# Patient Record
Sex: MALE | HISPANIC OR LATINO | Employment: FULL TIME | URBAN - NONMETROPOLITAN AREA
[De-identification: names, ages, dates, MRNs, and addresses within clinical notes are randomized per-mention and may not be internally consistent; named-entity substitution may affect disease eponyms.]

---

## 2017-08-23 ENCOUNTER — OCCUPATIONAL MEDICINE (OUTPATIENT)
Dept: URGENT CARE | Facility: PHYSICIAN GROUP | Age: 67
End: 2017-08-23
Payer: COMMERCIAL

## 2017-08-23 VITALS
SYSTOLIC BLOOD PRESSURE: 150 MMHG | BODY MASS INDEX: 30.16 KG/M2 | HEART RATE: 60 BPM | RESPIRATION RATE: 16 BRPM | DIASTOLIC BLOOD PRESSURE: 72 MMHG | WEIGHT: 181 LBS | TEMPERATURE: 98.2 F | OXYGEN SATURATION: 95 % | HEIGHT: 65 IN

## 2017-08-23 DIAGNOSIS — S80.01XA CONTUSION OF RIGHT KNEE, INITIAL ENCOUNTER: ICD-10-CM

## 2017-08-23 DIAGNOSIS — S86.911A KNEE STRAIN, RIGHT, INITIAL ENCOUNTER: ICD-10-CM

## 2017-08-23 PROCEDURE — 99214 OFFICE O/P EST MOD 30 MIN: CPT | Mod: 29 | Performed by: PHYSICIAN ASSISTANT

## 2017-08-23 ASSESSMENT — PAIN SCALES - GENERAL: PAINLEVEL: 10=SEVERE PAIN

## 2017-08-23 NOTE — Clinical Note
"EMPLOYEE’S CLAIM FOR COMPENSATION/ REPORT OF INITIAL TREATMENT  FORM C-4    EMPLOYEE’S CLAIM - PROVIDE ALL INFORMATION REQUESTED   First Name  Leobardo Last Name  Florin Birthdate                    1950                Sex  male Claim Number   Home Address  [address] Age  67 y.o. Height  1.651 m (5' 5\") Weight  82.101 kg (181 lb) N     [facility] Zip  [address] Telephone  [phone] (home)    Mailing Address  [address] [facility] Zip  [address] Primary Language Spoken  Divehi    Insurer  [place] [employer]   [place]   Employee's Occupation (Job Title) When Injury or Occupational Disease Occurred  [employer]    Employer's Name  OTHER  Telephone      Employer Address    City    State    Zip      Date of Injury  8/23/2017               Hour of Injury  8:30 AM Date Employer Notified  8/23/2017 Last Day of Work after Injury or Occupational Disease  8/23/2017 Supervisor to Whom Injury Reported  Lobito Aburto   Address or Location of Accident (if applicable)  [N/A]   What were you doing at the time of accident? (if applicable)  Moving things around    How did this injury or occupational disease occur? (Be specific an answer in detail. Use additional sheet if necessary)  Tripped over a tube   If you believe that you have an occupational disease, when did you first have knowledge of the disability and it relationship to your employment?  n/a Witnesses to the Accident  n/a      Nature of Injury or Occupational Disease  Workers' Compensation  Part(s) of Body Injured or Affected  Knee (R), ,     I certify that the above is true and correct to the best of my knowledge and that I have provided this information in order to obtain the benefits of [place] or [place]  I hereby authorize any physician, " chiropractor, surgeon, practitioner, or other person, any hospital, including [facility] or [facility], any medical service organization, any insurance company, or other institution or organization to release to each other, any medical or other information, including benefits paid or payable, pertinent to this injury or disease, except information relative to diagnosis, treatment and/or counseling for AIDS, psychological conditions, alcohol or controlled substances, for which I must give specific authorization.  A Photostat of this authorization shall be as valid as the original.     Date 08/23/2017   [facility]   Employee’s Signature   THIS REPORT MUST BE COMPLETED AND MAILED WITHIN 3 WORKING DAYS OF TREATMENT   [facility]  Name of Facility  [place]   Date  8/23/2017 Diagnosis  (S86.911A) Knee strain, right, initial encounter  (S80.01XA) Contusion of right knee, initial encounter Is there evidence the injured employee was under the influence of alcohol and/or another controlled substance at the time of accident?   Hour  10:59 AM Description of Injury or Disease  Diagnoses of Knee strain, right, initial encounter and Contusion of right knee, initial encounter were pertinent to this visit. No   Treatment  Rest, ice, over-the-counter anti-inflammatory  Have you advised the patient to remain off work five days or more? No   X-Ray Findings      If Yes   From Date  To Date      From information given by the employee, together with medical evidence, can you directly connect this injury or occupational disease as job incurred?  Yes If No Full Duty  No Modified Duty  Yes   Is additional medical care by a physician indicated?  Yes  Comments:follow-up in one week If Modified Duty, Specify any Limitations / Restrictions  No bending, climbing, squatting, no lifting more than 10 pounds.   Do you know of any previous injury or disease contributing to this condition  "or occupational disease?                            Yes  Comments:history of bilateral knee pain documented in his chart from primary care   Date  8/23/2017 Print Doctor’s Name Dwayne Holland PA-C I certify the employer’s copy of  this form was mailed on:   Address  [address] Insurer’s Use Only     [facility] Zip  [address]    Provider’s Tax ID Number  [id number]  Telephone  Dept: [phone]        e-SignSTADWAYNE WASHINGTON PA-C   e-Signature: Dr. Ambrosio Clark, Medical Director Degree           ORIGINAL-TREATING PHYSICIAN OR CHIROPRACTOR    PAGE 2-INSURER/TPA    PAGE 3-EMPLOYER    PAGE 4-EMPLOYEE             Form C-4 (rev10/07)              BRIEF DESCRIPTION OF RIGHTS AND BENEFITS  (Pursuant to [place]RS 616C.050)    Notice of Injury or Occupational Disease (Incident Report Form C-1): If an injury or occupational disease (OD) arises out of and in the  course of employment, you must provide written notice to your employer as soon as practicable, but no later than 7 days after the accident or  OD. Your employer shall maintain a sufficient supply of the required forms.    Claim for Compensation (Form C-4): If medical treatment is sought, the form C-4 is available at the place of initial treatment. A completed  \"Claim for Compensation\" (Form C-4) must be filed within 90 days after an accident or OD. The treating physician or chiropractor must,  within 3 working days after treatment, complete and mail to the employer, the employer's insurer and third-party , the Claim for  Compensation.    Medical Treatment: If you require medical treatment for your on-the-job injury or OD, you may be required to select a physician or  chiropractor from a list provided by your workers’ compensation insurer, if it has contracted with an Organization for Managed Care (MCO) or  Preferred Provider Organization (PPO) or providers of health care. If your employer has not entered into a contract with an MCO " or PPO, you  may select a physician or chiropractor from the Panel of Physicians and Chiropractors. Any medical costs related to your industrial injury or  OD will be paid by your insurer.    Temporary Total Disability (TTD): If your doctor has certified that you are unable to work for a period of at least 5 consecutive days, or 5  cumulative days in a 20-day period, or places restrictions on you that your employer does not accommodate, you may be entitled to TTD  compensation.    Temporary Partial Disability (TPD): If the wage you receive upon reemployment is less than the compensation for TTD to which you are  entitled, the insurer may be required to pay you TPD compensation to make up the difference. TPD can only be paid for a maximum of 24  months.    Permanent Partial Disability (PPD): When your medical condition is stable and there is an indication of a PPD as a result of your injury or  OD, within 30 days, your insurer must arrange for an evaluation by a rating physician or chiropractor to determine the degree of your PPD. The  amount of your PPD award depends on the date of injury, the results of the PPD evaluation and your age and wage.    Permanent Total Disability (PTD): If you are medically certified by a treating physician or chiropractor as permanently and totally disabled  and have been granted a PTD status by your insurer, you are entitled to receive monthly benefits not to exceed 66 2/3% of your average  monthly wage. The amount of your PTD payments is subject to reduction if you previously received a PPD award.    Vocational Rehabilitation Services: You may be eligible for vocational rehabilitation services if you are unable to return to the job due to a  permanent physical impairment or permanent restrictions as a result of your injury or occupational disease.    Transportation and Per Eloisa Reimbursement: You may be eligible for travel expenses and per eloisa associated with medical  treatment.    Reopening: You may be able to reopen your claim if your condition worsens after claim closure.    Appeal Process: If you disagree with a written determination issued by the insurer or the insurer does not respond to your request, you may  appeal to the Department of Administration, , by following the instructions contained in your determination letter. You must  appeal the determination within 70 days from the date of the determination letter at [address], [address], [place], [place]  [address], or [address], [place], [place] [address]. If you disagree with the  decision, you may appeal to the  Department of Administration, . You must file your appeal within 30 days from the date of the  decision  letter at [address], [address], [place], [place] [address], or [address], [place], [place] [address]. If you  disagree with a decision of an , you may file a petition for judicial review with the District Court. You must do so within 30  days of the Appeal Officer’s decision. You may be represented by an  at your own expense or you may contact the [facility] for possible  representation.    [place]  for Injured Workers (NAIW): If you disagree with a  decision, you may request that NAIW represent you  without charge at an  Hearing. For information regarding denial of benefits, you may contact the [facility] at: [address], [address], [place] [address], [phone], or [address], [place] [address], [phone]    To File a Complaint with the Division: If you wish to file a complaint with the  of the Division of Industrial Relations (DIR),  please contact the Workers’ Compensation Section, [address], [address], [place], [place] [address], telephone (114) [phone], or  [address], [address], [place], [place] [address], telephone [phone]) [phone].    For assistance with Workers’ Compensation Issues: you may contact the Office of the Governor Consumer Health Assistance, [address] [place] [address], [address], [place], [place] [address], Toll Free 1-789.310.8000, Web site: http://[employer].[facility], [facility].nv., E-mail  [email or web address], [facility].[email or web address].                                                                                                                                                                                                                                   __________________________________________________________________                                                                   ___08/23/2017______________                Employee Name / Signature                                                                                                                                                       Date                                                                                                                                                                                                     D-2 (rev. 10/07)

## 2017-08-23 NOTE — Clinical Note
[address] [place] [address]  Phone: [phone] - Fax: [phone]        [facility] Progress Report and Disability Certification  Date of Service: 2017   No Show:  No  Date / Time of Next Visit: 2017   Claim Information   Patient Name: Leobardo Catherine  Claim Number:     Employer: [employer] Date of Injury: 2017     Insurer / TPA: Anisha  ID / SSN:     Occupation: [employer]  Diagnosis: Diagnoses of Knee strain, right, initial encounter and Contusion of right knee, initial encounter were pertinent to this visit.    Medical Information   Related to Industrial Injury? Yes    Subjective Complaints:  Right knee pain after fall   Objective Findings: Right knee has minimal superficial abrasion over the patella. He has tenderness to palpation of the musculature and tissues superior to the patella. No specific joint line or popliteal space tenderness. Good distal range of motion, strength, circulation and sensation.     Pre-Existing Condition(s):     Assessment:   Initial Visit    Status: Additional Care Required  Comments:follow-up in one week  Permanent Disability:No    Plan: Medication  Comments:otc nsaid    Diagnostics:      Comments:       Disability Information   Status: Released to Restricted Duty    From:  2017  Through: 2017 Restrictions are: Temporary   Physical Restrictions   Sitting:    Standing:    Comments:no more than 30 minutes at a time without being able to rest the right knee Stoopin hrs/day Bendin hrs/day   Squattin hrs/day Walking:    Comments:no more than 30 minutes at a time without being able to rest the right knee Climbing:    Pushing:    Comments:no more than 10 pounds   Pulling:    Comments:10 pounds Other:    Reaching Above Shoulder (L):   Reaching Above Shoulder (R):       Reaching Below Shoulder (L):    Reaching Below Shoulder (R):      Not to exceed Weight  Limits   Carrying(hrs):   Weight Limit(lb): < or = to 10 pounds Lifting(hrs):   Weight  Limit(lb): < or = to 10 pounds   Comments:      Repetitive Actions   Hands: i.e. Fine Manipulations from Grasping:     Feet: i.e. Operating Foot Controls:     Driving / Operate Machinery:     Physician Name: Dwayne Holland PA-C Physician Signature: DWAYNE Avilez PA-C e-Signature: Dr. Ambrosio Clark, Medical Director   Clinic Name / Location: [address] [address] Clinic Phone Number: Dept: [phone]   Appointment Time: 10:45 Am Visit Start Time: 10:59 AM   Check-In Time:  10:53 Am Visit Discharge Time:  11:19 am   Original-Treating Physician or Chiropractor    Page 2-Insurer/TPA    Page 3-Employer    Page 4-Employee

## 2017-08-23 NOTE — MR AVS SNAPSHOT
"        Leobardo Catherine   2017 10:45 AM   Occupational Medicine   MRN: [id number]    Department:  [facility]   Dept Phone:  [phone]    Description:  Male : 1950   Provider:  Adams Holland PA-C           Reason for Visit     Knee Injury R knee pain after fall      Allergies as of 2017     No Known Allergies      You were diagnosed with     Knee strain, right, initial encounter   [[id number]]       Contusion of right knee, initial encounter   [[id number]]         Vital Signs     Blood Pressure Pulse Temperature Respirations Height Weight    150/72 mmHg 60 36.8 °C (98.2 °F) 16 1.651 m (5' 5\") 82.101 kg (181 lb)    Body Mass Index Oxygen Saturation Smoking Status             30.12 kg/m2 95% Former Smoker         Basic Information     Date Of Birth Sex Race Ethnicity Preferred Language    1950 Male  or   Origin (Armenian,Ugandan,Rwandan,Zambian, etc) Armenian      Health Maintenance        Date Due Completion Dates    IMM DTaP/Tdap/Td Vaccine (1 - Tdap) 1969 ---    COLONOSCOPY 2000 ---    IMM ZOSTER VACCINE 2010 ---    IMM PNEUMOCOCCAL 65+ (ADULT) LOW/MEDIUM RISK SERIES (1 of 2 - PCV13) 2015 ---    IMM INFLUENZA (1) 2017 ---            Current Immunizations     No immunizations on file.      Below and/or attached are the medications your provider expects you to take. Review all of your home medications and newly ordered medications with your provider and/or pharmacist. Follow medication instructions as directed by your provider and/or pharmacist. Please keep your medication list with you and share with your provider. Update the information when medications are discontinued, doses are changed, or new medications (including over-the-counter products) are added; and carry medication information at all times in the event of emergency situations     Allergies:  No Known Allergies          Medications  Valid as of: 2017 - 11:22 AM    Generic Name " Brand Name Tablet Size Instructions for use    Bismuth Subsalicylate (Suspension) PEPTO-BISMOL 262 MG/15ML Take 30 mL by mouth every 6 hours as needed.        Ibuprofen   Take  by mouth.        Meloxicam (Tab) MOBIC 15 MG Take 1 Tab by mouth every day.        MethylPREDNISolone (Tab) MEDROL DOSPACK 4 MG Take as directed.        .                 Medicines prescribed today were sent to:     [facility] [address] [place] [address], [place] - [address] [facility] [facility] [address] [place]    [address] [place] [address]    Phone: [phone] Fax: [phone]    Open 24 Hours?: No      Medication refill instructions:       If your prescription bottle indicates you have medication refills left, it is not necessary to call your provider’s office. Please contact your pharmacy and they will refill your medication.    If your prescription bottle indicates you do not have any refills left, you may request refills at any time through one of the following ways: The online Bee There system (except Urgent Care), by calling your provider’s office, or by asking your pharmacy to contact your provider’s office with a refill request. Medication refills are processed only during regular business hours and may not be available until the next business day. Your provider may request additional information or to have a follow-up visit with you prior to refilling your medication.   *Please Note: Medication refills are assigned a new Rx number when refilled electronically. Your pharmacy may indicate that no refills were authorized even though a new prescription for the same medication is available at the pharmacy. Please request the medicine by name with the pharmacy before contacting your provider for a refill.           Bee There Access Code: [email or web address]  Expires: 9/22/2017 11:22 AM    Your email address is not on file at [facility].  Email Addresses are required for you to sign up for Bee There, please contact  [phone] to verify your personal information and to provide your email address prior to attempting to register for Marcandit.    Leobardo Catherine  [address] [place] [address]    Merge Social  A secure, online tool to manage your health information     [employer]’s Merge Social® is a secure, online tool that connects you to your personalized health information from the privacy of your home -- day or night - making it very easy for you to manage your healthcare. Once the activation process is completed, you can even access your medical information using the Merge Social hayley, which is available for free in the Apple Hayley store or Google Play store.     To learn more about Merge Social, visit [email or web address]    There are two levels of access available (as shown below):   My Chart Features  [facility] Primary [facility] Doctor [facility]  Specialists [facility]  Urgent  Care Non-[facility] Primary Care Doctor   Email your healthcare team securely and privately 24/7 X X X    Manage appointments: schedule your next appointment; view details of past/upcoming appointments X      Request prescription refills. X      View recent personal medical records, including lab and immunizations X X X X   View health record, including health history, allergies, medications X X X X   Read reports about your outpatient visits, procedures, consult and ER notes X X X X   See your discharge summary, which is a recap of your hospital and/or ER visit that includes your diagnosis, lab results, and care plan X X  X     How to register for Marcandit:  Once your e-mail address has been verified, follow the following steps to sign up for Marcandit.     1. Go to  [email or web address]  2. Click on the Sign Up Now box, which takes you to the New Member Sign Up page. You will need to provide the following information:  a. Enter your Merge Social Access Code exactly as it appears at the top of this page. (You will not need to use this code after you’ve completed the sign-up  process. If you do not sign up before the expiration date, you must request a new code.)   b. Enter your date of birth.   c. Enter your home email address.   d. Click Submit, and follow the next screen’s instructions.  3. Create a [employer] ID. This will be your [employer] login ID and cannot be changed, so think of one that is secure and easy to remember.  4. Create a [employer] password. You can change your password at any time.  5. Enter your Password Reset Question and Answer. This can be used at a later time if you forget your password.   6. Enter your e-mail address. This allows you to receive e-mail notifications when new information is available in [employer].  7. Click Sign Up. You can now view your health information.    For assistance activating your [employer] account, call [phone]

## 2017-08-23 NOTE — PROGRESS NOTES
Chief Complaint   Patient presents with   • Knee Injury     R knee pain after fall       HISTORY OF PRESENT ILLNESS: Patient is a 67 y.o. male who presents today becauseHe has right knee pain. This is after fall at work approximately 3 hours ago. He slipped and tripped over some tubes that he was moving, he went down on his right knee. His pain is just above his kneecap in the muscle and tissue area. Denies any distal paresthesias. He has not been putting any ice or heat on the area, he did take some ibuprofen.. He does have some history of bilateral knee pain as documented in his chart approximately 2 years ago.    There are no active problems to display for this patient.      Allergies:Review of patient's allergies indicates no known allergies.    Current Outpatient Prescriptions Ordered in [facility]   Medication Sig Dispense Refill   • Ibuprofen (MOTRIN PO) Take  by mouth.     • methylPREDNISolone (MEDROL DOSPACK) 4 MG TABS Take as directed. 1 Tab 0   • meloxicam (MOBIC) 15 MG tablet Take 1 Tab by mouth every day. 30 Tab 0   • bismuth subsalicylate (PEPTO-BISMOL) 262 MG/15ML SUSP Take 30 mL by mouth every 6 hours as needed.       No current Epic-ordered facility-administered medications on file.       No past medical history on file.    Social History   Substance Use Topics   • Smoking status: Former Smoker   • Smokeless tobacco: Never Used   • Alcohol Use: No       No family status information on file.   No family history on file.    ROS:  Review of Systems   Constitutional: Negative for fever, chills, weight loss and malaise/fatigue.   HENT: Negative for ear pain, nosebleeds, congestion, sore throat and neck pain.    Eyes: Negative for blurred vision.   Respiratory: Negative for cough, sputum production, shortness of breath and wheezing.    Cardiovascular: Negative for chest pain, palpitations, orthopnea and leg swelling.       Exam:  Blood pressure 150/72, pulse 60, temperature 36.8 °C (98.2 °F), resp. rate 16,  "height 1.651 m (5' 5\"), weight 82.101 kg (181 lb), SpO2 95 %.  General:  Well nourished, well developed male in NAD  Head:Normocephalic, atraumatic  Eyes: PERRLA, EOM within normal limits, no conjunctival injection, no scleral icterus, visual fields and acuity grossly intact.  Extremities: no clubbing, cyanosis, or edema.   Right knee has minimal superficial abrasion over the patella. He has tenderness to palpation of the musculature and tissues superior to the patella. No specific joint line or popliteal space tenderness. Good distal range of motion, strength, circulation and sensation.    Please note that this dictation was created using voice recognition software. I have made every reasonable attempt to correct obvious errors, but I expect that there are errors of grammar and possibly content that I did not discover before finalizing the note.    Assessment/Plan:  1. Knee strain, right, initial encounter     2. Contusion of right knee, initial encounter     Encouraged gentle range of motion, weightbearing as tolerated, restrictions, ice, over-the-counter ibuprofen, follow-up in one week        "

## 2017-08-30 ENCOUNTER — OCCUPATIONAL MEDICINE (OUTPATIENT)
Dept: URGENT CARE | Facility: PHYSICIAN GROUP | Age: 67
End: 2017-08-30
Payer: COMMERCIAL

## 2017-08-30 VITALS
RESPIRATION RATE: 16 BRPM | HEART RATE: 74 BPM | OXYGEN SATURATION: 98 % | HEIGHT: 65 IN | WEIGHT: 184 LBS | DIASTOLIC BLOOD PRESSURE: 92 MMHG | BODY MASS INDEX: 30.66 KG/M2 | SYSTOLIC BLOOD PRESSURE: 144 MMHG | TEMPERATURE: 99.4 F

## 2017-08-30 DIAGNOSIS — S86.911A KNEE STRAIN, RIGHT, INITIAL ENCOUNTER: ICD-10-CM

## 2017-08-30 PROCEDURE — 99214 OFFICE O/P EST MOD 30 MIN: CPT | Performed by: PHYSICIAN ASSISTANT

## 2017-08-30 RX ORDER — TRAMADOL HYDROCHLORIDE 50 MG/1
50 TABLET ORAL EVERY 4 HOURS PRN
Qty: 30 TAB | Refills: 0 | Status: SHIPPED | OUTPATIENT
Start: 2017-08-30 | End: 2017-12-01

## 2017-08-30 RX ORDER — NAPROXEN 500 MG/1
500 TABLET ORAL 2 TIMES DAILY WITH MEALS
Qty: 60 TAB | Refills: 0 | Status: SHIPPED | OUTPATIENT
Start: 2017-08-30 | End: 2017-09-11

## 2017-08-30 NOTE — LETTER
[address] [place] [address]  Phone: [phone] - Fax: [phone]        [facility] Progress Report and Disability Certification  Date of Service: 2017   No Show:  No  Date / Time of Next Visit: 2017   Claim Information   Patient Name: Leobardo Catherine  Claim Number:     Employer: OTHER  Date of Injury: 2017     Insurer / TPA: Anisha  ID / SSN:     Occupation: Spout  Diagnosis: The encounter diagnosis was Knee strain, right, initial encounter.    Medical Information   Related to Industrial Injury? Yes    Subjective Complaints:  Worsening right knee pain after fall at work 6 days ago   Objective Findings: Right knee has some visible swelling just superior to the patella, he has tenderness in the area of the same, no popliteal space or joint line tenderness. Distally he has good range of motion, albeit somewhat limited by pain, he has good distal circulation and sensation.     Pre-Existing Condition(s):     Assessment:   Condition Worsened    Status: Additional Care Required  Comments:physical therapy, follow-up in 2 weeks  Permanent Disability:No    Plan: MedicationMedication (NOT at Work)  Comments:Ultram not to be taken at work or while driving    Diagnostics:      Comments:       Disability Information   Status: Released to Restricted Duty    From:  2017  Through: 2017 Restrictions are: Temporary   Physical Restrictions   Sitting:    Standing:    Comments:more than 30 minutes at a time without being able to rest and elevate the right leg Stoopin hrs/day Bendin hrs/day   Squattin hrs/day Walking:    Comments:number than 30 minutes at a time without being able to rest and elevate the right leg Climbin hrs/day Pushing:    Comments:no more than 10 pounds   Pulling:    Comments:no more than 10 pounds Other:    Reaching Above Shoulder (L):   Reaching Above Shoulder (R):       Reaching Below  Shoulder (L):    Reaching Below Shoulder (R):      Not to exceed Weight Limits   Carrying(hrs):   Weight Limit(lb): < or = to 10 pounds Lifting(hrs):   Weight  Limit(lb): < or = to 10 pounds   Comments:      Repetitive Actions   Hands: i.e. Fine Manipulations from Grasping:     Feet: i.e. Operating Foot Controls:     Driving / Operate Machinery:     Physician Name: Dwayne Holland P.A.-C. Physician Signature: DWAYNE Avilez P.A.-C. e-Signature: Dr. Ambrosio Clark, Medical Director   Clinic Name / Location: [address] [address] Clinic Phone Number: Dept: [phone]   Appointment Time: 5:00 Pm Visit Start Time: 4:55 PM   Check-In Time:  4:49 Pm Visit Discharge Time:  5:30 PM   Original-Treating Physician or Chiropractor    Page 2-Insurer/TPA    Page 3-Employer    Page 4-Employee

## 2017-08-31 NOTE — PROGRESS NOTES
Chief Complaint   Patient presents with   • Follow-Up     Rt Knee, throbbing pain, Rash-Bilateral-Arms       HISTORY OF PRESENT ILLNESS: Patient is a 67 y.o. male who presents today becauseHe is following up on a work comp injury.    On 8/23/2017. He slipped and tripped over some tubes that he was moving. He went down on his right knee. He started to have pain just above his knee In the muscle and tissue of his right knee and upper leg. He came to the urgent care, advised him to use ibuprofen, ice the area, he has also been using a knee brace. However, his pain is getting worse. She still denies any distal paresthesias but his pain is worsening and he is having difficulty walking, difficulty sleeping because of the pain despite using over-the-counter anti-inflammatory medication    There are no active problems to display for this patient.      Allergies:Review of patient's allergies indicates no known allergies.    Current Outpatient Prescriptions Ordered in [facility]   Medication Sig Dispense Refill   • tramadol (ULTRAM) 50 MG Tab Take 1 Tab by mouth every four hours as needed. 30 Tab 0   • naproxen (NAPROSYN) 500 MG Tab Take 1 Tab by mouth 2 times a day, with meals. 60 Tab 0   • Ibuprofen (MOTRIN PO) Take  by mouth.     • methylPREDNISolone (MEDROL DOSPACK) 4 MG TABS Take as directed. 1 Tab 0   • meloxicam (MOBIC) 15 MG tablet Take 1 Tab by mouth every day. 30 Tab 0   • bismuth subsalicylate (PEPTO-BISMOL) 262 MG/15ML SUSP Take 30 mL by mouth every 6 hours as needed.       No current Epic-ordered facility-administered medications on file.        No past medical history on file.    Social History   Substance Use Topics   • Smoking status: Former Smoker     Types: Cigarettes     Quit date: 1/1/2015   • Smokeless tobacco: Never Used   • Alcohol use No       No family status information on file.   No family history on file.    ROS:  Review of Systems   Constitutional: Negative for fever, chills, weight loss and  "malaise/fatigue.   HENT: Negative for ear pain, nosebleeds, congestion, sore throat and neck pain.    Eyes: Negative for blurred vision.   Respiratory: Negative for cough, sputum production, shortness of breath and wheezing.    Cardiovascular: Negative for chest pain, palpitations, orthopnea and leg swelling.   Gastrointestinal: Negative for heartburn, nausea, vomiting and abdominal pain.   Genitourinary: Negative for dysuria, urgency and frequency.     Exam:  Blood pressure 144/92, pulse 74, temperature 37.4 °C (99.4 °F), resp. rate 16, height 1.651 m (5' 5\"), weight 83.5 kg (184 lb), SpO2 98 %.  General:  Well nourished, well developed male in NAD  Head:Normocephalic, atraumatic  Eyes: PERRLA, EOM within normal limits, no conjunctival injection, no scleral icterus, visual fields and acuity grossly intact.  Extremities: no clubbing, cyanosis, or edema.  Right knee has some visible swelling just superior to the patella, he has tenderness in the area of the same, no popliteal space or joint line tenderness. Distally he has good range of motion, albeit somewhat limited by pain, he has good distal circulation and sensation.    Please note that this dictation was created using voice recognition software. I have made every reasonable attempt to correct obvious errors, but I expect that there are errors of grammar and possibly content that I did not discover before finalizing the note.    Assessment/Plan:  1. Knee strain, right, initial encounter  tramadol (ULTRAM) 50 MG Tab    naproxen (NAPROSYN) 500 MG Tab    REFERRAL TO PHYSICAL THERAPY Reason for Therapy: Eval/Treat/Report   . Continue ice, follow-up in 2 weeks    Followup with primary care in the next 7-10 days if not significantly improving, return to the urgent care or go to the emergency room sooner for any worsening of symptoms.       "

## 2017-09-11 ENCOUNTER — OCCUPATIONAL MEDICINE (OUTPATIENT)
Dept: OCCUPATIONAL MEDICINE | Facility: CLINIC | Age: 67
End: 2017-09-11
Payer: COMMERCIAL

## 2017-09-11 ENCOUNTER — APPOINTMENT (OUTPATIENT)
Dept: RADIOLOGY | Facility: IMAGING CENTER | Age: 67
End: 2017-09-11
Attending: PREVENTIVE MEDICINE
Payer: COMMERCIAL

## 2017-09-11 VITALS
SYSTOLIC BLOOD PRESSURE: 150 MMHG | RESPIRATION RATE: 16 BRPM | OXYGEN SATURATION: 97 % | DIASTOLIC BLOOD PRESSURE: 88 MMHG | WEIGHT: 176 LBS | HEIGHT: 65 IN | BODY MASS INDEX: 29.32 KG/M2 | TEMPERATURE: 98.7 F | HEART RATE: 88 BPM

## 2017-09-11 DIAGNOSIS — S86.911A STRAIN OF RIGHT KNEE, INITIAL ENCOUNTER: ICD-10-CM

## 2017-09-11 DIAGNOSIS — S82.044A CLOSED NONDISPLACED COMMINUTED FRACTURE OF RIGHT PATELLA, INITIAL ENCOUNTER: ICD-10-CM

## 2017-09-11 PROCEDURE — 73562 X-RAY EXAM OF KNEE 3: CPT | Mod: TC,RT | Performed by: EMERGENCY MEDICINE

## 2017-09-11 PROCEDURE — L1830 KO IMMOB CANVAS LONG PRE OTS: HCPCS | Performed by: PREVENTIVE MEDICINE

## 2017-09-11 PROCEDURE — 99204 OFFICE O/P NEW MOD 45 MIN: CPT | Performed by: PREVENTIVE MEDICINE

## 2017-09-11 RX ORDER — DICLOFENAC SODIUM 75 MG/1
75 TABLET, DELAYED RELEASE ORAL 2 TIMES DAILY
Qty: 60 TAB | Refills: 0 | Status: SHIPPED | OUTPATIENT
Start: 2017-09-11 | End: 2017-12-01

## 2017-09-11 NOTE — LETTER
[address],   [address] [place] [address]  Phone: [phone] - Fax: [phone]        [facility] Progress Report and Disability Certification  Date of Service: 9/11/2017   No Show:  No  Date / Time of Next Visit: 10/2/17@2:00 PM   Claim Information   Patient Name: Leobardo Catherine  Claim Number:     Employer: [employer] Date of Injury: 8/23/2017     Insurer / TPA: Anisha  ID / SSN:     Occupation: Spout  Diagnosis: Diagnoses of Strain of right knee, initial encounter and Closed nondisplaced comminuted fracture of right patella, initial encounter were pertinent to this visit.    Medical Information   Related to Industrial Injury? Yes    Subjective Complaints:  DOI 8/23/2017: Patient slipped and tripped over some tubes that he was moving. He went down on his right knee sustaining a right knee strain. Seen in [facility], given tramadol, naprosyn and physical therapy referral. Patient states that right knee pain is more or less the same. He states the pain is mostly in the anterior aspect but extends the posterior knee, pain is achy in nature but sharp occasionally. Pain is worse with prolonged standing, squatting. He takes Naprosyn occasionally for relief. Physical therapy as had to be approved. He states he's been working light duty with minimal difficulty. Denies any radiating pain, numbness, tingling, giving out or instability.   Objective Findings: Right Knee: No gross deformity. Tenderness palpation at the patella and diffusely lateral and posterior knee. Full range of motion with some pain in extension. Strength intact. Anterior/posterior drawer test negative. No laxity with varus or valgus stress.    Pre-Existing Condition(s):     Assessment:   Condition Same    Status: Additional Care Required  Permanent Disability:No    Plan:      Diagnostics:      Comments:  XR Right Knee:Acute patellar fracture, non-displaced  Provided knee brace. Weight bear  as tolerated.  Prescribed diclofenac twice daily. May take Tylenol with diclofenac  Recommend heat/ice and OTC ointments as needed for relief  Hold PT until next v  isit  Continue restricted duty  Follow-up 3 weeks, may follow-up sooner if needed  Patient is improving and wishes to wait on Ortho referral, will repeat XR in 3 weeks    Disability Information   Status: Released to Restricted Duty    From:  2017  Through: 10/3/2017 Restrictions are: Temporary   Physical Restrictions   Sitting:    Standing:  < or = to 4 hrs/day Stooping:    Bending:  < or = to 2 hrs/day   Squatting:  < or = to 1 hr/day Walking:  < or = to 4 hrs/day Climbin hrs/day Pushing:      Pulling:    Other:    Reaching Above Shoulder (L):   Reaching Above Shoulder (R):       Reaching Below Shoulder (L):    Reaching Below Shoulder (R):      Not to exceed Weight Limits   Carrying(hrs):   Weight Limit(lb): < or = to 10 pounds Lifting(hrs):   Weight  Limit(lb): < or = to 10 pounds   Comments: Seated work 50% of time. Allow to use crutches if needed    Repetitive Actions   Hands: i.e. Fine Manipulations from Grasping:     Feet: i.e. Operating Foot Controls:     Driving / Operate Machinery:     Physician Name: Senthil Laguna D.O. Physician Signature: jaySignTASENTHIL BURNS D.O. e-Signature: Dr. Ambrosio Clark, Medical Director   Clinic Name / Location: [address],   [address] [place] [address] Clinic Phone Number: Dept: [phone]   Appointment Time: 2:20 Pm Visit Start Time: 1:55 PM   Check-In Time:  1:53 Pm Visit Discharge Time:  3:55 PM   Original-Treating Physician or Chiropractor    Page 2-Insurer/TPA    Page 3-Employer    Page 4-Employee

## 2017-09-11 NOTE — LETTER
[address],   [address] [place] [address]  Phone: [phone] - Fax: [phone]        [facility] Progress Report and Disability Certification  Date of Service: 9/11/2017   No Show:  No  Date / Time of Next Visit: 10/3/2017   Claim Information   Patient Name: Leobardo Catherine  Claim Number:     Employer: OTHER *** Date of Injury: 8/23/2017     Insurer / TPA: Anisha *** ID / SSN:     Occupation: Spout *** Diagnosis: Diagnoses of Strain of right knee, initial encounter and Closed nondisplaced comminuted fracture of right patella, initial encounter were pertinent to this visit.    Medical Information   Related to Industrial Injury? Yes ***   Subjective Complaints:  DOI 8/23/2017: Patient slipped and tripped over some tubes that he was moving. He went down on his right knee sustaining a right knee strain. Seen in [facility], given tramadol, naprosyn and physical therapy referral. Patient states that right knee pain is more or less the same. He states the pain is mostly in the anterior aspect but extends the posterior knee, pain is achy in nature but sharp occasionally. Pain is worse with prolonged standing, squatting. He takes Naprosyn occasionally for relief. Physical therapy as had to be approved. He states he's been working light duty with minimal difficulty. Denies any radiating pain, numbness, tingling, giving out or instability.   Objective Findings: Right Knee: No gross deformity. Tenderness palpation at the patella and diffusely lateral and posterior knee. Full range of motion with some pain in extension. Strength intact. Anterior/posterior drawer test negative. No laxity with varus or valgus stress.    Pre-Existing Condition(s):     Assessment:   Condition Same    Status: Additional Care Required  Permanent Disability:No    Plan:      Diagnostics:      Comments:  XR Right Knee:Acute patellar fracture, non-displaced  Provided knee brace.  Weight bear as tolerated.  Prescribed diclofenac twice daily. May take Tylenol with diclofenac  Recommend heat/ice and OTC ointments as needed for relief  Begin physical thera  py when approved  Continue restricted duty  Follow-up 3 weeks, may follow-up sooner if needed  Patient is improving and wishes to wait on Ortho referral, will repeat XR in 3 weeks    Disability Information   Status: Released to Restricted Duty    From:  2017  Through: 10/3/2017 Restrictions are: Temporary   Physical Restrictions   Sitting:    Standing:  < or = to 4 hrs/day Stooping:    Bending:  < or = to 2 hrs/day   Squatting:  < or = to 1 hr/day Walking:  < or = to 4 hrs/day Climbin hrs/day Pushing:      Pulling:    Other:    Reaching Above Shoulder (L):   Reaching Above Shoulder (R):       Reaching Below Shoulder (L):    Reaching Below Shoulder (R):      Not to exceed Weight Limits   Carrying(hrs):   Weight Limit(lb): < or = to 10 pounds Lifting(hrs):   Weight  Limit(lb): < or = to 10 pounds   Comments: Seated work 50% of time. Allow to use crutches if needed    Repetitive Actions   Hands: i.e. Fine Manipulations from Grasping:     Feet: i.e. Operating Foot Controls:     Driving / Operate Machinery:     Physician Name: Senthil Laguna D.O. Physician Signature: jaySignTASENTHIL BURNS D.O. e-Signature: Dr. Ambrosio Clark, Medical Director   Clinic Name / Location: [address],   [address] [place] [address] Clinic Phone Number: Dept: [phone]   Appointment Time: 2:20 Pm Visit Start Time: 1:55 PM   Check-In Time:  1:53 Pm Visit Discharge Time:  ***   Original-Treating Physician or Chiropractor    Page 2-Insurer/TPA    Page 3-Employer    Page 4-Employee

## 2017-09-11 NOTE — PROGRESS NOTES
"Subjective:      Leobardo Catherine is a 67 y.o. male who presents with Follow-Up (WC DOI 8/23/17 Knee Left feeling the same)      DOI 8/23/2017: Patient slipped and tripped over some tubes that he was moving. He went down on his right knee sustaining a right knee strain. Seen in [facility], given tramadol, naprosyn and physical therapy referral. Patient states that right knee pain is more or less the same. He states the pain is mostly in the anterior aspect but extends the posterior knee, pain is achy in nature but sharp occasionally. Pain is worse with prolonged standing, squatting. He takes Naprosyn occasionally for relief. Physical therapy as had to be approved. He states he's been working light duty with minimal difficulty. Denies any radiating pain, numbness, tingling, giving out or instability.     HPI    ROS  ROS: All systems were reviewed on intake form, form was reviewed and signed. See scanned documents in media. Pertinent positives and negatives included in HPI.    PMH: No pertinent past medical history to this problem  MEDS: Medications were reviewed in Epic  ALLERGIES: No Known Allergies  SOCHX: Works as an associate at [employer]   FH: No pertinent family history to this problem       Objective:     /88   Pulse 88   Temp 37.1 °C (98.7 °F)   Resp 16   Ht 1.651 m (5' 5\")   Wt 79.8 kg (176 lb)   SpO2 97%   BMI 29.29 kg/m²      Physical Exam   Constitutional: He is oriented to person, place, and time. He appears well-developed and well-nourished.   HENT:   Right Ear: External ear normal.   Left Ear: External ear normal.   Eyes: Conjunctivae and EOM are normal.   Cardiovascular: Normal rate.    Pulmonary/Chest: Effort normal.   Neurological: He is alert and oriented to person, place, and time.   Skin: Skin is warm and dry.   Psychiatric: He has a normal mood and affect. Judgment normal.       Right Knee: No gross deformity. Tenderness palpation at the patella and diffusely lateral and posterior knee. Full " range of motion with some pain in extension. Strength intact. Anterior/posterior drawer test negative. No laxity with varus or valgus stress.        Assessment/Plan:     1. Strain of right knee, initial encounter  - DX-KNEE 3 VIEWS RIGHT; Future    2. Closed nondisplaced comminuted fracture of right patella, initial encounter    XR Right Knee:Acute patellar fracture, non-displaced  Provided knee brace. Weight bear as tolerated.  Prescribed diclofenac twice daily. May take Tylenol with diclofenac  Recommend heat/ice and OTC ointments as needed for relief  Hold PT until next visit  Continue restricted duty  Follow-up 3 weeks, may follow-up sooner if needed  Patient is improving and wishes to wait on Ortho referral, will repeat XR in 3 weeks

## 2017-10-02 ENCOUNTER — OCCUPATIONAL MEDICINE (OUTPATIENT)
Dept: OCCUPATIONAL MEDICINE | Facility: CLINIC | Age: 67
End: 2017-10-02
Payer: COMMERCIAL

## 2017-10-02 ENCOUNTER — APPOINTMENT (OUTPATIENT)
Dept: RADIOLOGY | Facility: IMAGING CENTER | Age: 67
End: 2017-10-02
Attending: PREVENTIVE MEDICINE
Payer: COMMERCIAL

## 2017-10-02 VITALS
DIASTOLIC BLOOD PRESSURE: 100 MMHG | OXYGEN SATURATION: 93 % | TEMPERATURE: 98.9 F | HEART RATE: 72 BPM | SYSTOLIC BLOOD PRESSURE: 180 MMHG

## 2017-10-02 DIAGNOSIS — S86.911A STRAIN OF RIGHT KNEE, INITIAL ENCOUNTER: ICD-10-CM

## 2017-10-02 DIAGNOSIS — S82.044A CLOSED NONDISPLACED COMMINUTED FRACTURE OF RIGHT PATELLA, INITIAL ENCOUNTER: ICD-10-CM

## 2017-10-02 PROCEDURE — 73564 X-RAY EXAM KNEE 4 OR MORE: CPT | Mod: TC,RT | Performed by: PREVENTIVE MEDICINE

## 2017-10-02 PROCEDURE — 99213 OFFICE O/P EST LOW 20 MIN: CPT | Performed by: PREVENTIVE MEDICINE

## 2017-10-02 ASSESSMENT — PAIN SCALES - GENERAL: PAINLEVEL: 1=MINIMAL PAIN

## 2017-10-02 NOTE — LETTER
[address],   [address] [place] [address]  Phone:  [phone] - Fax:  [phone]   [facility] Progress Report and Disability Certification  Date of Service: 10/2/2017   No Show:  No  Date / Time of Next Visit:  MMI   Claim Information   Patient Name: Leobardo Catherine  Claim Number:     Employer: [employer] Date of Injury: 8/23/2017     Insurer / TPA: Anisha  ID / SSN:     Occupation: Spout  Diagnosis: Diagnoses of Strain of right knee, initial encounter and Closed nondisplaced comminuted fracture of right patella, initial encounter were pertinent to this visit.    Medical Information   Related to Industrial Injury? Yes    Subjective Complaints:  DOI 8/23/2017: Patient slipped and tripped over some tubes that he was moving. He went down on his right knee sustaining a right knee strain. Patient states that overall his right knee feels much improved. He states he has no pain at the anterior knee or patella. He states he has some occasional pain on the proximal lateral aspect of the knee, but he states that is the same pain he has had for years due to arthritis of the knee.   Objective Findings: Right Knee: No gross deformity. No tenderness of the knee. Full range of motion without pain or difficulty. Strength intact. Anterior/posterior drawer test negative. No laxity with varus or valgus stress.    Pre-Existing Condition(s):     Assessment:   Condition Improved    Status: Discharged /  MMI  Permanent Disability:No    Plan:      Diagnostics:      Comments:  X-ray right knee: fracture apparent, non-displaced  Patients pain has resolved feels he is okay for full duty   OTC ibuprofen as needed  Full duty  Release from care  Follow up as needed    Disability Information   Status: Released to Full Duty    From:  10/2/2017  Through:   Restrictions are:     Physical Restrictions   Sitting:    Standing:    Stooping:    Bending:      Squatting:    Walking:    Climbing:    Pushing:      Pulling:    Other:    Reaching Above Shoulder (L):   Reaching Above Shoulder (R):       Reaching Below Shoulder (L):    Reaching Below Shoulder (R):      Not to exceed Weight Limits   Carrying(hrs):   Weight Limit(lb):   Lifting(hrs):   Weight  Limit(lb):     Comments:      Repetitive Actions   Hands: i.e. Fine Manipulations from Grasping:     Feet: i.e. Operating Foot Controls:     Driving / Operate Machinery:     Physician Name: Senthil Laguna D.O. Physician Signature: SENTHIL Santiago D.O. e-Signature: Dr. Ambrosio Clark, Medical Director   Clinic Name / Location: [address],   [address]  [place] [address] Clinic Phone Number: Dept: [phone]   Appointment Time: 2:00 Pm Visit Start Time: 1:53 PM   Check-In Time:  1:50 Pm Visit Discharge Time:  3:39 PM   Original-Treating Physician or Chiropractor    Page 2-Insurer/TPA    Page 3-Employer    Page 4-Employee

## 2017-10-02 NOTE — PROGRESS NOTES
Subjective:      Leobardo Catherine is a 67 y.o. male who presents with Follow-Up (- DOi 08/23/17- R knee- better- ROOM 25)      DOI 8/23/2017: Patient slipped and tripped over some tubes that he was moving. He went down on his right knee sustaining a right knee strain. Patient states that overall his right knee feels much improved. He states he has no pain at the anterior knee or patella. He states he has some occasional pain on the proximal lateral aspect of the knee, but he states that is the same pain he has had for years due to arthritis of the knee.     HPI    ROS       Objective:     BP (!) 180/100 Comment: dr fink will double check Bp   Pulse 72   Temp 37.2 °C (98.9 °F)   SpO2 93%      Physical Exam    Right Knee: No gross deformity. No tenderness of the knee. Full range of motion without pain or difficulty. Strength intact. Anterior/posterior drawer test negative. No laxity with varus or valgus stress.        Assessment/Plan:     1. Strain of right knee, initial encounter  - DX-KNEE COMPLETE 4+ RIGHT; Future    2. Closed nondisplaced comminuted fracture of right patella, initial encounter  - DX-KNEE COMPLETE 4+ RIGHT; Future    X-ray right knee: continues with fracture, minimal healing  Patients pain has resolved feels he is okay for full duty   Unlikely at this point that fracture will change, given resolution of symptoms will release from care  OTC ibuprofen as needed  Full duty  Release from care  Follow up as needed

## 2017-10-23 ENCOUNTER — NON-PROVIDER VISIT (OUTPATIENT)
Dept: URGENT CARE | Facility: PHYSICIAN GROUP | Age: 67
End: 2017-10-23

## 2017-10-23 DIAGNOSIS — Z01.10 ENCOUNTER FOR AUDIOLOGY EVALUATION: ICD-10-CM

## 2017-10-23 PROCEDURE — 92553 AUDIOMETRY AIR & BONE: CPT | Performed by: PHYSICIAN ASSISTANT

## 2017-11-15 ENCOUNTER — HOSPITAL ENCOUNTER (OUTPATIENT)
Facility: MEDICAL CENTER | Age: 67
End: 2017-11-15
Attending: ORTHOPAEDIC SURGERY | Admitting: ORTHOPAEDIC SURGERY
Payer: COMMERCIAL

## 2017-12-01 VITALS — HEIGHT: 60 IN | BODY MASS INDEX: 36.83 KG/M2 | WEIGHT: 187.61 LBS

## 2017-12-01 DIAGNOSIS — Z01.812 PRE-OPERATIVE LABORATORY EXAMINATION: ICD-10-CM

## 2017-12-01 DIAGNOSIS — Z01.810 PRE-OPERATIVE CARDIOVASCULAR EXAMINATION: ICD-10-CM

## 2017-12-01 LAB
ANION GAP SERPL CALC-SCNC: 9 MMOL/L (ref 0–11.9)
APPEARANCE UR: CLEAR
BACTERIA #/AREA URNS HPF: NEGATIVE /HPF
BILIRUB UR QL STRIP.AUTO: NEGATIVE
BUN SERPL-MCNC: 14 MG/DL (ref 8–22)
CALCIUM SERPL-MCNC: 9.4 MG/DL (ref 8.5–10.5)
CHLORIDE SERPL-SCNC: 104 MMOL/L (ref 96–112)
CO2 SERPL-SCNC: 25 MMOL/L (ref 20–33)
COLOR UR: YELLOW
CREAT SERPL-MCNC: 0.67 MG/DL (ref 0.5–1.4)
CULTURE IF INDICATED INDCX: NO UA CULTURE
EKG IMPRESSION: NORMAL
EPI CELLS #/AREA URNS HPF: NEGATIVE /HPF
ERYTHROCYTE [DISTWIDTH] IN BLOOD BY AUTOMATED COUNT: 39 FL (ref 35.9–50)
GFR SERPL CREATININE-BSD FRML MDRD: >60 ML/MIN/1.73 M 2
GLUCOSE SERPL-MCNC: 196 MG/DL (ref 65–99)
GLUCOSE UR STRIP.AUTO-MCNC: NEGATIVE MG/DL
HCT VFR BLD AUTO: 46.1 % (ref 42–52)
HGB BLD-MCNC: 15.4 G/DL (ref 14–18)
HYALINE CASTS #/AREA URNS LPF: ABNORMAL /LPF
KETONES UR STRIP.AUTO-MCNC: NEGATIVE MG/DL
LEUKOCYTE ESTERASE UR QL STRIP.AUTO: NEGATIVE
MCH RBC QN AUTO: 29.7 PG (ref 27–33)
MCHC RBC AUTO-ENTMCNC: 33.4 G/DL (ref 33.7–35.3)
MCV RBC AUTO: 89 FL (ref 81.4–97.8)
MICRO URNS: ABNORMAL
NITRITE UR QL STRIP.AUTO: NEGATIVE
PH UR STRIP.AUTO: 7 [PH]
PLATELET # BLD AUTO: 237 K/UL (ref 164–446)
PMV BLD AUTO: 11.3 FL (ref 9–12.9)
POTASSIUM SERPL-SCNC: 3.6 MMOL/L (ref 3.6–5.5)
PROT UR QL STRIP: NEGATIVE MG/DL
RBC # BLD AUTO: 5.18 M/UL (ref 4.7–6.1)
RBC # URNS HPF: ABNORMAL /HPF
RBC UR QL AUTO: ABNORMAL
SODIUM SERPL-SCNC: 138 MMOL/L (ref 135–145)
SP GR UR STRIP.AUTO: 1.02
UROBILINOGEN UR STRIP.AUTO-MCNC: 1 MG/DL
WBC # BLD AUTO: 9 K/UL (ref 4.8–10.8)
WBC #/AREA URNS HPF: ABNORMAL /HPF

## 2017-12-01 PROCEDURE — 87641 MR-STAPH DNA AMP PROBE: CPT

## 2017-12-01 PROCEDURE — 87640 STAPH A DNA AMP PROBE: CPT | Mod: 59

## 2017-12-01 PROCEDURE — 81001 URINALYSIS AUTO W/SCOPE: CPT

## 2017-12-01 PROCEDURE — 93005 ELECTROCARDIOGRAM TRACING: CPT

## 2017-12-01 PROCEDURE — 85027 COMPLETE CBC AUTOMATED: CPT

## 2017-12-01 PROCEDURE — 93010 ELECTROCARDIOGRAM REPORT: CPT | Performed by: INTERNAL MEDICINE

## 2017-12-01 PROCEDURE — 80048 BASIC METABOLIC PNL TOTAL CA: CPT

## 2017-12-01 PROCEDURE — 36415 COLL VENOUS BLD VENIPUNCTURE: CPT

## 2017-12-01 NOTE — OR NURSING
"  Pt here for pre admit appointment and pt's son states they want procedure done at [facility]. Contacted Patsy at Dr Villalpando's office. She spoke with son and informed pt will have to be scheduled after first of the year. Son discussed insurance with PAR and now decides to have procedure at [facility].     Pre-admit appointment completed. \"Preparing for your procedure\" sheet given to pt along with verbal and written instructions.   "

## 2017-12-01 NOTE — DISCHARGE PLANNING
DISCHARGE PLANNING NOTE - TOTAL JOINT     Procedure: Procedure(s):  KNEE ARTHROPLASTY TOTAL  Procedure Date: 12/18/2017  Insurance:  Payor: DEWEY / Plan: DEWEY PPO / Product Type: *No Product type* /   Equipment currently available at home? cane, front-wheel walker and shower chair  Steps into the home? 1  Steps within the home? 0  Toilet height? Standard  Type of shower? walk-in shower  Who will be with you during your recovery? other: Family  Is Outpatient Physical Therapy set up after surgery? Yes   Did you take the Total Joint Class and where? Yes, at [facility]     Plan:

## 2017-12-02 LAB
SCCMEC + MECA PNL NOSE NAA+PROBE: NEGATIVE
SCCMEC + MECA PNL NOSE NAA+PROBE: POSITIVE

## 2023-05-10 ENCOUNTER — NON-PROVIDER VISIT (OUTPATIENT)
Dept: URGENT CARE | Facility: PHYSICIAN GROUP | Age: 73
End: 2023-05-10

## 2023-05-10 DIAGNOSIS — Z02.1 PRE-EMPLOYMENT DRUG SCREENING: ICD-10-CM

## 2023-05-10 LAB
AMP AMPHETAMINE: NORMAL
COC COCAINE: NORMAL
INT CON NEG: NORMAL
INT CON POS: NORMAL
MET METHAMPHETAMINES: NORMAL
OPI OPIATES: NORMAL
PCP PHENCYCLIDINE: NORMAL
POC DRUG COMMENT 753798-OCCUPATIONAL HEALTH: NORMAL
THC: NORMAL

## 2023-05-10 PROCEDURE — 80305 DRUG TEST PRSMV DIR OPT OBS: CPT

## 2024-05-07 ENCOUNTER — NON-PROVIDER VISIT (OUTPATIENT)
Dept: URGENT CARE | Facility: PHYSICIAN GROUP | Age: 74
End: 2024-05-07

## 2024-05-07 DIAGNOSIS — Z02.1 PRE-EMPLOYMENT DRUG SCREENING: ICD-10-CM

## 2024-05-07 PROCEDURE — 80305 DRUG TEST PRSMV DIR OPT OBS: CPT | Performed by: PHYSICIAN ASSISTANT

## 2024-10-03 ENCOUNTER — OFFICE VISIT (OUTPATIENT)
Dept: URGENT CARE | Facility: PHYSICIAN GROUP | Age: 74
End: 2024-10-03

## 2024-10-03 DIAGNOSIS — Z96.29: ICD-10-CM

## 2024-10-03 PROCEDURE — 92552 PURE TONE AUDIOMETRY AIR: CPT | Performed by: FAMILY MEDICINE

## 2024-10-03 PROCEDURE — 8916 PR CLEARANCE ONLY: Performed by: FAMILY MEDICINE
